# Patient Record
Sex: MALE | Race: WHITE | Employment: UNEMPLOYED | URBAN - METROPOLITAN AREA
[De-identification: names, ages, dates, MRNs, and addresses within clinical notes are randomized per-mention and may not be internally consistent; named-entity substitution may affect disease eponyms.]

---

## 2022-12-06 ENCOUNTER — OFFICE VISIT (OUTPATIENT)
Dept: INTERNAL MEDICINE | Facility: CLINIC | Age: 47
End: 2022-12-06
Payer: COMMERCIAL

## 2022-12-06 VITALS
SYSTOLIC BLOOD PRESSURE: 135 MMHG | DIASTOLIC BLOOD PRESSURE: 94 MMHG | WEIGHT: 227.9 LBS | HEART RATE: 81 BPM | OXYGEN SATURATION: 98 %

## 2022-12-06 DIAGNOSIS — Z00.00 HEALTHCARE MAINTENANCE: Primary | ICD-10-CM

## 2022-12-06 DIAGNOSIS — N50.819 TESTICULAR DISCOMFORT: ICD-10-CM

## 2022-12-06 DIAGNOSIS — J45.909 UNCOMPLICATED ASTHMA, UNSPECIFIED ASTHMA SEVERITY, UNSPECIFIED WHETHER PERSISTENT: ICD-10-CM

## 2022-12-06 PROCEDURE — 99386 PREV VISIT NEW AGE 40-64: CPT | Mod: GC

## 2022-12-06 RX ORDER — LAMOTRIGINE 25 MG/1
TABLET ORAL
COMMUNITY
Start: 2021-08-12

## 2022-12-06 RX ORDER — BUSPIRONE HYDROCHLORIDE 10 MG/1
20 TABLET ORAL
COMMUNITY
Start: 2021-09-09

## 2022-12-06 RX ORDER — ALBUTEROL SULFATE 90 UG/1
1-2 AEROSOL, METERED RESPIRATORY (INHALATION) EVERY 6 HOURS PRN
Qty: 18 G | Refills: 3 | Status: SHIPPED | OUTPATIENT
Start: 2022-12-06

## 2022-12-06 RX ORDER — RISPERIDONE 4 MG/1
4 TABLET ORAL
COMMUNITY
Start: 2021-12-21

## 2022-12-06 RX ORDER — OXCARBAZEPINE 150 MG/1
150 TABLET, FILM COATED ORAL AT BEDTIME
COMMUNITY

## 2022-12-06 RX ORDER — GABAPENTIN 100 MG/1
CAPSULE ORAL
COMMUNITY
Start: 2021-10-20

## 2022-12-06 RX ORDER — BENZTROPINE MESYLATE 0.5 MG/1
0.5 TABLET ORAL 2 TIMES DAILY
COMMUNITY

## 2022-12-06 ASSESSMENT — ANXIETY QUESTIONNAIRES
1. FEELING NERVOUS, ANXIOUS, OR ON EDGE: NEARLY EVERY DAY
3. WORRYING TOO MUCH ABOUT DIFFERENT THINGS: NEARLY EVERY DAY
IF YOU CHECKED OFF ANY PROBLEMS ON THIS QUESTIONNAIRE, HOW DIFFICULT HAVE THESE PROBLEMS MADE IT FOR YOU TO DO YOUR WORK, TAKE CARE OF THINGS AT HOME, OR GET ALONG WITH OTHER PEOPLE: SOMEWHAT DIFFICULT
6. BECOMING EASILY ANNOYED OR IRRITABLE: MORE THAN HALF THE DAYS
5. BEING SO RESTLESS THAT IT IS HARD TO SIT STILL: SEVERAL DAYS
2. NOT BEING ABLE TO STOP OR CONTROL WORRYING: MORE THAN HALF THE DAYS

## 2022-12-06 NOTE — PATIENT INSTRUCTIONS
To schedule your appointment with imaging, please call [phone].      To schedule an lab appointment at the [facility], please call [phone].       To schedule your follow up appointment, please call [phone]

## 2022-12-06 NOTE — NURSING NOTE
Marco Guillen is a 47 year old male that presents in clinic today for the following:     Chief Complaint   Patient presents with     Physical     Pt reports some neurological inhaler; wants albuterol and flonase     Establish Care       The patient's allergies and medications were reviewed. The patient's vitals were obtained without incident. The patient does not have any other questions for the provider.     Nat Marcos, EMT at 12:20 PM on 12/6/2022.  Primary Care Clinic: [phone]

## 2022-12-06 NOTE — PROGRESS NOTES
PRIMARY CARE CENTER         HPI:       Marco Guillen is a 47 year old male who presents for annual physical. His only complaint today is some occasional testicular discomfort. He says it first began about a decade ago in 2011. He saw a urologist for it who said there was nothing concerning. It went away but then he noticed it again about a month ago. Says it is discomfort in the back of the testicles, sometimes the R testicles effected, other days it is the L. Denies swelling or fever, no discharge, not currently sexually active. Says sometimes he stretches the skin near the area which improves the pain. Hasn't felt any testicular nodules Occasional pain when sitting down but resolves with adjusting his position.     Otherwise he has a history of bipolar disorder and anxiety, on buspirone, lamotrigine, oxcarbazepine, risperidone. He skips half of his risperidone doses because they have made him gain weight in the past. He says his mood is stable. He sometimes has periods of agitation where he becomes verbally abusive but he is working to improve this by realizing when he is mad and stepping away to clear his head. He did not want to talk further on his mood, drug use, or any psychiatric issues. He follows a psychiatrist at [place], says he has a phone appointment scheduled soon.      Diet: Processed foods from shared housing units. They serve breads and carbs but he tries to limit his carbs. Typically drinks water  Exercise: Stretches, goes on long walks daily  Sleep: No issues with sleep  Mood: Stable  Alcohol: None  Recreational drugs: Daily meth, used pot before visit  Home situation: Lives in shared housing unit      Problem, Medication and Allergy Lists were reviewed and are current.  Patient is a new patient to this clinic and so  I reviewed/updated the Past Medical History, the Family History and the Social History.          Review of Systems:     ROS as above in HPI, otherwise negative           Physical Exam:   BP (!) 135/94 (BP Location: Right arm, Patient Position: Sitting, Cuff Size: Adult Large)   Pulse 81   Wt 103.4 kg (227 lb 14.4 oz)   SpO2 98%   There is no height or weight on file to calculate BMI.  Vitals were reviewed       Physical Exam  Constitutional:       General: He is not in acute distress.  Eyes:      General: No scleral icterus.     Extraocular Movements: Extraocular movements intact.   Neck:      Comments: No thyromegaly  Cardiovascular:      Rate and Rhythm: Normal rate and regular rhythm.      Heart sounds: Normal heart sounds.   Pulmonary:      Effort: Pulmonary effort is normal.      Breath sounds: Normal breath sounds.   Abdominal:      General: Abdomen is flat. Bowel sounds are normal. There is no distension.      Palpations: Abdomen is soft.   Genitourinary:     Comments: No testicular nodules notes, no hernia noted, no edema, redness, or pain. No elevation of testicle. Nontender to palpation    Musculoskeletal:      Comments: No pitting edema in bl LE   Lymphadenopathy:      Cervical: No cervical adenopathy.   Neurological:      Mental Status: He is alert.             Assessment and Plan     Marco was seen today for physical and establish care.    Diagnoses and all orders for this visit:    #Healthcare maintenance  - Colonoscopy  - HIV, Hep C screening  - Declined flu vaccine today  - Will order the following labs at the request of his psychiatrist, to be faxed to [phone]: CBC, CMP, Lipid panel, A1C, TSH, Vit D, Prolactin    #Testicular discomfort  Does not appear to be infectious or inflammatory, no hernia on exam. Possible enlargement of vessel on exam which could indicate varicocele  - US Testicular & Scrotum w Doppler; Future    #HTN  BP in clinic today 135/94, he endorses being anxious for this visit. No BP checks at home  - Will follow up in 6 months for BP, if still elevated will do 24 hr BP ambulatory monitoring        Options for treatment and follow-up care  were reviewed with the patient. Marco Guillen engaged in the decision making process and verbalized understanding of the options discussed and agreed with the final plan.      Pt was seen and plan of care discussed with Dr. Harris.     Rafal Kolb, PGY1  Dec 6, 2022      Attending Addendum:  Patient seen and examined with resident in clinic today.  Pertinent portions of history and exam were independently verified by myself.  I agree with the exam and plan as outlined above with the following modifications: none.  Liz Harris MD  Internal Medicine

## 2022-12-15 ENCOUNTER — TELEPHONE (OUTPATIENT)
Dept: INTERNAL MEDICINE | Facility: CLINIC | Age: 47
End: 2022-12-15

## 2022-12-15 NOTE — TELEPHONE ENCOUNTER
TRICIA w/ [facility] number for pt to schedule 5-6mo follow up w/ Dr. Kolb

## 2024-06-27 ENCOUNTER — HOSPITAL ENCOUNTER (EMERGENCY)
Facility: CLINIC | Age: 49
Discharge: HOME | End: 2024-06-27
Attending: EMERGENCY MEDICINE | Admitting: EMERGENCY MEDICINE
Payer: COMMERCIAL

## 2024-06-27 VITALS
RESPIRATION RATE: 18 BRPM | DIASTOLIC BLOOD PRESSURE: 102 MMHG | TEMPERATURE: 97.9 F | WEIGHT: 190 LBS | BODY MASS INDEX: 27.2 KG/M2 | HEIGHT: 70 IN | HEART RATE: 76 BPM | SYSTOLIC BLOOD PRESSURE: 153 MMHG | OXYGEN SATURATION: 96 %

## 2024-06-27 DIAGNOSIS — F32.9 MAJOR DEPRESSIVE DISORDER, REMISSION STATUS UNSPECIFIED, UNSPECIFIED WHETHER RECURRENT: ICD-10-CM

## 2024-06-27 LAB
AMPHETAMINES UR QL SCN: NORMAL
BARBITURATES UR QL SCN: NORMAL
BENZODIAZ UR QL SCN: NORMAL
BZE UR QL SCN: NORMAL
CANNABINOIDS UR QL SCN: NORMAL
FENTANYL UR QL: NORMAL
HOLD SPECIMEN: NORMAL
OPIATES UR QL SCN: NORMAL
PCP QUAL URINE (ROCHE): NORMAL

## 2024-06-27 PROCEDURE — 80307 DRUG TEST PRSMV CHEM ANLYZR: CPT | Performed by: EMERGENCY MEDICINE

## 2024-06-27 PROCEDURE — 99283 EMERGENCY DEPT VISIT LOW MDM: CPT

## 2024-06-27 PROCEDURE — 250N000013 HC RX MED GY IP 250 OP 250 PS 637: Performed by: EMERGENCY MEDICINE

## 2024-06-27 RX ADMIN — NICOTINE POLACRILEX 2 MG: 2 GUM, CHEWING ORAL at 14:08

## 2024-06-27 ASSESSMENT — ACTIVITIES OF DAILY LIVING (ADL)
ADLS_ACUITY_SCORE: 35

## 2024-06-27 NOTE — ED NOTES
"RN in for assessment and pt sitting on bed- has skateboard, large bag of personal belongings, backpack and is talking and not making any logical sense. Pt reports he was here from alf, then in an encampment, talking about female fighter jets, being taken out of place like binladen and many other things that make no sense. Pt states he was at a treatment facility but he was \"too advanced\" for them. Denies using substances but states hx of meth use and triage staff noted treatment staff were concerned about meth. Pt states he believes he is doing well. Denies SI/HI. Most of pt's belongings locked in locker and RN went through backpack to ensure no weapons or dangerous items present.  "

## 2024-06-27 NOTE — ED TRIAGE NOTES
"Pt states he is unsure of why he is here, and that his group home \"dropped him off here\".   Pt also thinks he's maybe withdrawing from something.   Pt hyperactive in triage, and pacing around. Pt states hx of cocaine and meth use, and states \"I don't care about myself or other people\"      Triage Assessment (Adult)       Row Name 06/27/24 1139          Triage Assessment    Airway WDL WDL        Respiratory WDL    Respiratory WDL WDL                     "

## 2024-06-27 NOTE — ED NOTES
Pt starting to pace around room some- states he is wasting his time and then talking about pentagons and other nonsensical things. Pt stating he wants to leave and thinks he is being held hostage. Pt states he is a smoker and needs a cigarette. RN states pt can have nicotine gum which pt is thankful for. RN told Dr Adamson pt is getting more aggitated and talking about leaving. MD states pt not holdable. Pt updated that DEC is to see him around [id number]

## 2024-06-27 NOTE — ED PROVIDER NOTES
"    History     Chief Complaint:  Psychiatric Evaluation       HPI   Marco Guillen is a 49 year old male who presents to the emergency department possibly on his own or from group home.  No group home staff were ever seen or spoken to and patient is unable to tell me where he is living.  Patient is very obtuse and his answer to questions he cannot tell me why he is here or what he wants done.  I have reviewed his notes from previous visits most overly most of which have been in [facility] he was seen there a few times in November 2023 for methamphetamine abuse homelessness and knee pain.  Patient is on several medications for mental health and states he is currently taking Wellbutrin and Lamictal I do not see a definite mental health diagnosis however he denies fevers chills headache shortness of breath focal numbness or weakness speech or swallowing problems.      Independent Historian:    Patient    Review of External Notes:  Review of outpatient notes    Medications:    nicotine (NICORETTE) 2 MG gum  albuterol (PROAIR HFA/PROVENTIL HFA/VENTOLIN HFA) 108 (90 Base) MCG/ACT inhaler  ALBUTEROL 90 MCG/ACT IN AERS  benztropine (COGENTIN) 0.5 MG tablet  busPIRone (BUSPAR) 10 MG tablet  gabapentin (NEURONTIN) 100 MG capsule  lamoTRIgine (LAMICTAL) 25 MG tablet  OXcarbazepine (TRILEPTAL) 150 MG tablet  risperiDONE (RISPERDAL) 4 MG tablet  ROBITUSSIN A-C  MG/5ML OR SYRP        Past Medical History:    No past medical history on file.    Past Surgical History:    No past surgical history on file.       Physical Exam   Patient Vitals for the past 24 hrs:   BP Temp Temp src Pulse Resp SpO2 Height Weight   06/27/24 1145 (!) 153/102 97.9  F (36.6  C) Temporal 76 18 96 % 1.765 m (5' 9.5\") 86.2 kg (190 lb)        Physical Exam  Constitutional: middle-aged white male talkative with no respiratory distress  HENT: No signs of trauma.  Oropharynx clear  Eyes: EOM are normal. Pupils are equal, round, and reactive to " light.   Neck: Normal range of motion. No JVD present. No cervical adenopathy.  Cardiovascular: Regular rhythm.  Exam reveals no gallop and no friction rub.    No murmur heard.  Pulmonary/Chest: Bilateral breath sounds normal. No wheezes, rhonchi or rales.  Abdominal: Soft. No tenderness. No rebound or guarding.   Musculoskeletal: No edema. No tenderness.   Lymphadenopathy: No lymphadenopathy.   Neurological: Alert and oriented to person, place, and time. Normal strength. Coordination normal.   Skin: Skin is warm and dry. No rash noted. No erythema.    Psych: Patient has scattered speech very tangential somewhat confused and disorganized he however denies any suicidal or homicidal thought and denies any auditory or visual psychosis his affect is calm.  Emergency Department Course       Imaging:  No orders to display       Laboratory:  Labs Ordered and Resulted from Time of ED Arrival to Time of ED Departure   URINE DRUG SCREEN PANEL - Normal       Result Value    Amphetamines Urine Screen Negative      Barbituates Urine Screen Negative      Benzodiazepine Urine Screen Negative      Cannabinoids Urine Screen Negative      Cocaine Urine Screen Negative      Fentanyl Qual Urine Screen Negative      Opiates Urine Screen Negative      PCP Urine Screen Negative          Procedures   None    Emergency Department Course & Assessments:    Interventions:  Medications   nicotine (NICORETTE) gum 2 mg (2 mg Buccal $Given 6/27/24 5090)        Assessments:  Seen and evaluated    Independent Interpretation (X-rays, CTs, rhythm strip):  None    Consultations/Discussion of Management or Tests:  None       Social Determinants of Health affecting care:  Homelessness discharge     Disposition:  Discharge    Impression & Plan    CMS Diagnoses: None       Medical Decision Making:  This middle-aged white male presents to the emergency department is uncertain if he may have come from her group home or not.  He has a hard time saying why he  is here when he would like other than some nicotine gum.  Reviewing of his old chart shows that he is on multiple mental health drugs and has had several visits to [facility] in the past year.  Patient's affect is calm although his speech is all over the place.  He has no statements of impending harm to himself or anyone else and is not having any hallucinations at this time.  U tox was unremarkable and initially the patient was agreeable to wait for a DEC evaluation however it is several hours and 2 decades free and able to see him and he prefers to leave I do not think he is holdable and is not a danger to himself or others.  He states he will follow-up with [facility] and his own mental health providers and return to the ED if symptoms worsen.        Diagnosis:    ICD-10-CM    1. Major depressive disorder, remission status unspecified, unspecified whether recurrent  F32.9            Discharge Medications:  Discharge Medication List as of 6/27/2024  3:03 PM        START taking these medications    Details   nicotine (NICORETTE) 2 MG gum Place 1 each (2 mg) inside cheek every hour as needed for nicotine withdrawal symptoms, Disp-20 each, R-0, Local Print                Wali Adamson MD  6/27/2024   No att. providers found              Wali Adamson MD  06/27/24 8067